# Patient Record
Sex: MALE | Race: WHITE | NOT HISPANIC OR LATINO | Employment: FULL TIME | RURAL
[De-identification: names, ages, dates, MRNs, and addresses within clinical notes are randomized per-mention and may not be internally consistent; named-entity substitution may affect disease eponyms.]

---

## 2018-03-15 ENCOUNTER — TRANSCRIBE ORDERS (OUTPATIENT)
Dept: PHYSICAL THERAPY | Facility: HOSPITAL | Age: 53
End: 2018-03-15

## 2018-03-15 DIAGNOSIS — M25.531 RIGHT WRIST PAIN: Primary | ICD-10-CM

## 2018-03-21 ENCOUNTER — HOSPITAL ENCOUNTER (OUTPATIENT)
Dept: PHYSICAL THERAPY | Facility: HOSPITAL | Age: 53
Setting detail: THERAPIES SERIES
Discharge: HOME | End: 2018-03-21

## 2018-03-21 DIAGNOSIS — M25.531 RIGHT WRIST PAIN: Primary | ICD-10-CM

## 2018-03-21 PROCEDURE — 97161 PT EVAL LOW COMPLEX 20 MIN: CPT

## 2018-03-22 NOTE — THERAPY DISCHARGE NOTE
"     Outpatient Physical Therapy Ortho Initial Evaluation/Discharge  [facility]     Patient Name: Valerio Ramírez  : 1965  MRN: [id number]  Today's Date: 3/21/2018      Visit Date: 2018    There is no problem list on file for this patient.       History reviewed. No pertinent past medical history.     Past Surgical History:   Procedure Laterality Date   • HERNIA REPAIR       Allergies   Allergen Reactions   • Tylenol [Acetaminophen] Unknown (See Comments)     \"intolerance\"      Medications  Naproxen  Tramadol    Visit Dx:     ICD-10-CM ICD-9-CM   1. Right wrist pain M25.531 719.43             Patient History     Row Name 18 1600             History    Chief Complaint Pain  -NH      Type of Pain Wrist pain  -NH      Date Current Problem(s) Began 02/10/18  -NH      Brief Description of Current Complaint Patient fell and landed on R wrist. Patient reports his X-rays were inconclusive and MD wasn't absolutely sure whether or not the bones were fractured. Stated that he had multiple X-rays done.  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 Gripping, using arm  -NH        User Key  (r) = Recorded By, (t) = Taken By, (c) = Cosigned By    Initials Name Provider Type    NH Nel Vidal, PT Physical Therapist                PT Ortho     Row Name 18 1600       Subjective Comments    Subjective Comments Patient reports the MD released him and he doesn't have any restrictions.   -NH       Posture/Observations    Posture/Observations Comments Normal, slightly rounded shld. Patient wearing wrist brace at entry. Patient able to make full composite fist and good opposition all fingers.  -NH       General ROM    RT Upper Ext Rt Wrist " Extension;Rt Wrist Flexion;Rt Ulnar Deviation;Rt Radial Deviation;Rt Elbow Supination;Rt Elbow Pronation  -NH       Right Upper Ext    Rt Elbow Supination AROM 90  -NH    Rt Elbow Pronation AROM 78  -NH    Rt Wrist Flexion AROM 50  -NH    Rt Wrist Extension AROM 60  -NH    Rt  Ulnar Deviation AROM 28  -NH    Rt  Radial Deviation AROM 24  -NH        Strength Right    # Reps 1  -NH    Right Rung 2  -NH    Right  Test 1 40  -NH     Strength Average Right 40  -NH        Strength Left    # Reps 1  -NH    Left Rung 2  -NH    Left  Test 1 60  -NH     Strength Average Left 60  -NH       Hand  Strength     Strength Affected Side Right;Left  -NH      User Key  (r) = Recorded By, (t) = Taken By, (c) = Cosigned By    Initials Name Provider Type    NH Nel Vidal, PT Physical Therapist                                  PT OP Goals     Row Name 03/21/18 1900          PT Short Term Goals    STG Date to Achieve 03/21/18  -NH     STG 1 Patient will verbalize understanding and demonstrate proper return of HEP.  -NH     STG 1 Progress Met  -NH        Time Calculation    PT Goal Re-Cert Due Date 03/21/18  -NH       User Key  (r) = Recorded By, (t) = Taken By, (c) = Cosigned By    Initials Name Provider Type    NH Nel Vidal, PT Physical Therapist                PT Assessment/Plan     Row Name 03/21/18 1900          PT Assessment    Functional Limitations Limitation in home management;Limitations in functional capacity and performance;Performance in work activities  -NH     Impairments Range of motion;Pain;Muscle strength;Joint mobility  -NH     Assessment Comments Patient is a 53 year old male presenting to therapy with R wrist pain resultant of a mechanical fall. Patient presents with impairments in ROM and functional  strength. Patient has good dexterity of fingers and able to make full composite fist. Patient would benefit from skilled physical therapy to address deficits and return to  [facility]; however, Patient is refusing further therapy at this time due to financial and insurance restrictions. Patient was given comprehensive HEP and tips on advancements. Patient able to demonstrate proper return.   -NH     Please refer to paper survey for additional self-reported information Yes  -NH     Rehab Potential Good  -NH     Patient/caregiver participated in establishment of treatment plan and goals Yes  -NH     Patient would benefit from skilled therapy intervention Yes  -NH        PT Plan    PT Frequency One time visit  -NH     Planned CPT's? PT EVAL LOW COMPLEXITY: 50896  -NH     Physical Therapy Interventions (Optional Details) home exercise program  -NH     PT Plan Comments 1x HEP  -NH       User Key  (r) = Recorded By, (t) = Taken By, (c) = Cosigned By    Initials Name Provider Type    NH Nel Vidal, PT Physical Therapist                Exercises     Row Name 03/21/18 1600             Subjective Comments    Subjective Comments Patient reports the MD released him and he doesn't have any restrictions.   -NH         Aquatics    Aquatics performed? No  -NH         Exercise 1    Exercise Name 1 6 pack hand  -NH      Reps 1 20 each  -NH         Exercise 2    Exercise Name 2 AROM Wrist  -NH      Reps 2 20 each  -NH         Exercise 3    Exercise Name 3 Supination/Pronation  -NH      Reps 3 20 each  -NH         Exercise 4    Exercise Name 4 Putty ,pinches,star finger extension  -NH      Time 4 2 min each  -NH        User Key  (r) = Recorded By, (t) = Taken By, (c) = Cosigned By    Initials Name Provider Type    NH Nel Vidal, PT Physical Therapist                       Outcome Measure Options: Quick DASH  Quick DASH  Open a tight or new jar.: Severe Difficulty  Do heavy household chores (e.g., wash walls, wash floors): Moderate Difficulty  Carry a shopping bag or briefcase: Moderate Difficulty  Wash your back: Mild Difficulty  Use a knife to cut food: Mild Difficulty  Recreational activities  in which you take some force or impact through your arm, should or hand (e.g. golf, hammering, tennis, etc.): Severe Difficulty  During the past week, to what extent has your arm, shoulder, or hand problem interfered with your normal social activites with family, friends, neighbors or groups?: Moderately  During the past week, were you limited in your work or other regular daily activities as a result of your arm, shoulder or hand problem?: Moderately Limited  Arm, Shoulder, or hand pain: Severe  Tingling (pins and needles) in your arm, shoulder, or hand: Moderate  During the past week, how much difficulty have you had sleeping because of the pain in your arm, shoulder or hand?: Mild Difficulty  Number of Questions Answered: 11  Quick DASH Score: 50         Time Calculation:   Start Time: 1615  Stop Time: 1645  Time Calculation (min): 30 min  Total Timed Code Minutes- PT: 0 minute(s)    Therapy Charges for Today     Code Description Service Date Service Provider Modifiers Qty    84664019787 HC PT EVAL LOW COMPLEXITY 2 3/21/2018 Nel Vidal, PT GP 1          PT G-Codes  Outcome Measure Options: Quick DASH              Nel Vidal, PT  3/21/2018

## 2019-05-23 NOTE — PROGRESS NOTES
NIMESH Mendez  [facility]   Respiratory Disease Clinic  [address] [place] [address]  Phone: [phone]  Fax: [phone]     Valerio Ramírez is a 54 y.o. male.   CC:   Chief Complaint   Patient presents with   • Shortness of Breath        HPI: This is a new patient referral who reports that he has had asthma his entire life.  This past year he has had frequent bronchitis and is currently finishing up some antibiotics and is on Mucinex as well.  He is a non-smoker, he reports that he smoked for a short period of time when he was in high school but not as an adult.  He works in the [employer] system and does occasionally have exposure to lung irritants such as pepper spray.  He had a chest x-ray done at [facility] in February of this year that showed some air trapping but no acute infiltrates.  He denies any specific daily symptoms related to his asthma and says that if it had not been for the frequent bronchitis this year he probably would not have sought pulmonary evaluation.  He has had complete pulmonary function testing today that shows a moderate obstructive defect that would be consistent with his history of asthma.  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  He is on daily Singulair and occasionally uses his rescue inhaler and/or albuterol breathing treatments.  He stays up-to-date on flu and pneumonia vaccines and says that he recently had his annual TB skin test done as well.    The following portions of the patient's history were reviewed and updated as appropriate: allergies, current medications, past family history, past medical history, past social history, past surgical history and problem list.    History reviewed. No  "pertinent past medical history.    History reviewed. No pertinent family history.    Social History     Socioeconomic History   • Marital status:      Spouse name: Not on file   • Number of children: Not on file   • Years of education: Not on file   • Highest education level: Not on file   Tobacco Use   • Smoking status: Former Smoker   • Smokeless tobacco: Never Used   Substance and Sexual Activity   • Drug use: No   • Sexual activity: Defer       Review of Systems   Constitutional: Negative for appetite change, chills, fatigue and fever.   HENT: Negative for trouble swallowing and voice change.    Eyes: Negative for visual disturbance.   Respiratory: Positive for cough and wheezing.    Cardiovascular: Negative for chest pain and leg swelling.   Gastrointestinal: Negative for abdominal distention, abdominal pain, nausea and vomiting.   Genitourinary: Negative.    Musculoskeletal: Negative for gait problem and myalgias.   Skin: Negative.    Neurological: Negative for weakness.   Hematological: Negative.    Psychiatric/Behavioral: The patient is not nervous/anxious.         Reports snoring but denies poor sleep quality       /84   Pulse 86   Ht 172.7 cm (68\")   Wt 100 kg (221 lb)   SpO2 99% Comment: RA  BMI 33.60 kg/m²     Physical Exam   Constitutional: He is oriented to person, place, and time. He appears well-developed and well-nourished. No distress.   HENT:   Head: Normocephalic and atraumatic.   Mallampati Class III   Eyes: Pupils are equal, round, and reactive to light. No scleral icterus.   Neck: Normal range of motion. Neck supple.   Cardiovascular: Normal rate, regular rhythm, S1 normal and S2 normal.   Pulmonary/Chest: Effort normal and breath sounds normal. No tachypnea. He has no wheezes. He has no rhonchi. He has no rales.   Abdominal: Soft. Bowel sounds are normal. He exhibits no distension.   Musculoskeletal: Normal range of motion. He exhibits tenderness (wrap to right knee). He " exhibits no edema.   Lymphadenopathy:     He has no cervical adenopathy.   Neurological: He is alert and oriented to person, place, and time.   Skin: Skin is warm and dry. No rash noted. No cyanosis. Nails show no clubbing.   Psychiatric: He has a normal mood and affect. His behavior is normal.   Vitals reviewed.      My interpretation of  Pulmonary Functions Testing: Spirometry shows a moderate obstructive defect with FEV1 of 73% predicted, mid flows are decreased at 49% predicted.  Actual FEV1/FVC is 67.08.  Lung volumes show a normal total lung capacity, and increase in functional residual capacity and residual volume that would be indicative of some air trapping.  Diffusion capacity is normal.    FEV1   Date Value Ref Range Status   05/24/2019 73% liters Final     FVC   Date Value Ref Range Status   05/24/2019 88% liters Final     FEV1/FVC   Date Value Ref Range Status   05/24/2019 67.08 % Final     TLC   Date Value Ref Range Status   05/24/2019 108% liters Final     DLCO   Date Value Ref Range Status   05/24/2019 121% ml/mmHg sec Final       Valerio was seen today for shortness of breath.    Diagnoses and all orders for this visit:    Moderate asthma, unspecified whether complicated, unspecified whether persistent  -     Pulmonary Function Test  -     Alpha - 1 - Antitrypsin; Future    Simple chronic bronchitis (CMS/HCC)      Patient's Body mass index is 33.6 kg/m². BMI is above normal parameters. Recommendations include: referral to primary care.      Follow-up/Plan: Alpha-1 antitrypsin deficiency testing has been done today and we will call the results of that to the patient.  He does not have any desire to be on any long-acting bronchodilator therapy so he will continue with albuterol in the form of a rescue inhaler and/or breathing treatments.  He does take Singulair daily and uses Mucinex as needed.  Return to clinic in 1 year with annual spirometry.    Donato Bliss, NIMESH  5/24/2019  12:01  PM

## 2019-05-24 ENCOUNTER — OFFICE VISIT (OUTPATIENT)
Dept: PULMONOLOGY | Facility: CLINIC | Age: 54
End: 2019-05-24

## 2019-05-24 VITALS
DIASTOLIC BLOOD PRESSURE: 84 MMHG | HEIGHT: 68 IN | OXYGEN SATURATION: 99 % | BODY MASS INDEX: 33.49 KG/M2 | HEART RATE: 86 BPM | WEIGHT: 221 LBS | SYSTOLIC BLOOD PRESSURE: 152 MMHG

## 2019-05-24 DIAGNOSIS — J41.0 SIMPLE CHRONIC BRONCHITIS (HCC): ICD-10-CM

## 2019-05-24 DIAGNOSIS — R06.02 SHORTNESS OF BREATH: Primary | ICD-10-CM

## 2019-05-24 DIAGNOSIS — J45.909 MODERATE ASTHMA, UNSPECIFIED WHETHER COMPLICATED, UNSPECIFIED WHETHER PERSISTENT: Primary | ICD-10-CM

## 2019-05-24 LAB
DIFF CAP.CO: NORMAL ML/MMHG SEC
FEV1/FVC: 67.08 %
FEV1: NORMAL LITERS
FVC VOL RESPIRATORY: NORMAL LITERS
TLC: NORMAL LITERS

## 2019-05-24 PROCEDURE — 94729 DIFFUSING CAPACITY: CPT | Performed by: NURSE PRACTITIONER

## 2019-05-24 PROCEDURE — 36415 COLL VENOUS BLD VENIPUNCTURE: CPT | Performed by: NURSE PRACTITIONER

## 2019-05-24 PROCEDURE — 99204 OFFICE O/P NEW MOD 45 MIN: CPT | Performed by: NURSE PRACTITIONER

## 2019-05-24 PROCEDURE — 94010 BREATHING CAPACITY TEST: CPT | Performed by: NURSE PRACTITIONER

## 2019-05-24 PROCEDURE — 94727 GAS DIL/WSHOT DETER LNG VOL: CPT | Performed by: NURSE PRACTITIONER

## 2019-05-24 RX ORDER — DILTIAZEM HYDROCHLORIDE 120 MG/1
120 TABLET, FILM COATED ORAL
COMMUNITY
Start: 2014-07-21

## 2019-05-24 RX ORDER — DULAGLUTIDE 0.75 MG/.5ML
INJECTION, SOLUTION SUBCUTANEOUS
COMMUNITY
Start: 2019-02-15

## 2019-05-24 RX ORDER — PRAVASTATIN SODIUM 40 MG
40 TABLET ORAL
COMMUNITY
Start: 2014-07-21

## 2019-05-24 RX ORDER — MONTELUKAST SODIUM 10 MG/1
10 TABLET ORAL NIGHTLY
COMMUNITY

## 2019-05-24 RX ORDER — COVID-19 ANTIGEN TEST
KIT MISCELLANEOUS
COMMUNITY

## 2019-05-24 RX ORDER — ALBUTEROL SULFATE 90 UG/1
AEROSOL, METERED RESPIRATORY (INHALATION)
COMMUNITY
Start: 2014-01-13

## 2019-05-24 RX ORDER — LISINOPRIL 30 MG/1
TABLET ORAL
COMMUNITY

## 2019-05-24 RX ORDER — KETOROLAC TROMETHAMINE 10 MG/1
TABLET, FILM COATED ORAL
COMMUNITY
Start: 2019-05-22

## 2019-05-24 RX ORDER — ALLOPURINOL 300 MG/1
TABLET ORAL
COMMUNITY
Start: 2014-07-21

## 2019-05-24 RX ORDER — NATEGLINIDE 120 MG/1
TABLET ORAL
COMMUNITY

## 2019-05-24 ASSESSMENT — PULMONARY FUNCTION TESTS: FEV1/FVC: 67.08

## 2019-05-24 NOTE — PATIENT INSTRUCTIONS
Asthma, Adult  Asthma is a long-term (chronic) condition in which the airways get tight and narrow. The airways are the breathing passages that lead from the nose and mouth down into the lungs. A person with asthma will have times when symptoms get worse. These are called asthma attacks. They can cause coughing, whistling sounds when you breathe (wheezing), shortness of breath, and chest pain. They can make it hard to breathe. There is no cure for asthma, but medicines and lifestyle changes can help control it.  There are many things that can bring on an asthma attack or make asthma symptoms worse (triggers). Common triggers include:  · Mold.  · Dust.  · Cigarette smoke.  · Cockroaches.  · Things that can cause allergy symptoms (allergens). These include animal skin flakes (dander) and pollen from trees or grass.  · Things that pollute the air. These may include household , wood smoke, smog, or chemical odors.  · Cold air, weather changes, and wind.  · Crying or laughing hard.  · Stress.  · Certain medicines or drugs.  · Certain foods such as dried fruit, potato chips, and grape juice.  · Infections, such as a cold or the flu.  · Certain medical conditions or diseases.  · Exercise or tiring activities.    Asthma may be treated with medicines and by staying away from the things that cause asthma attacks. Types of medicines may include:  · Controller medicines. These help prevent asthma symptoms. They are usually taken every day.  · Fast-acting reliever or rescue medicines. These quickly relieve asthma symptoms. They are used as needed and provide short-term relief.  · Allergy medicines if your attacks are brought on by allergens.  · Medicines to help control the body's defense (immune) system.    Follow these instructions at home:  Avoiding triggers in your home  · Change your heating and air conditioning filter often.  · Limit your use of fireplaces and wood stoves.  · Get rid of pests (such as roaches and  mice) and their droppings.  · Throw away plants if you see mold on them.  · Clean your floors. Dust regularly. Use cleaning products that do not smell.  · Have someone vacuum when you are not home. Use a vacuum  with a HEPA filter if possible.  · Replace carpet with wood, tile, or vinyl yosi. Carpet can trap animal skin flakes and dust.  · Use allergy-proof pillows, mattress covers, and box spring covers.  · Wash bed sheets and blankets every week in hot water. Dry them in a dryer.  · Keep your bedroom free of any triggers.   · Avoid pets and keep windows closed when things that cause allergy symptoms are in the air.  · Use blankets that are made of polyester or cotton.  · Clean bathrooms and osiris with bleach. If possible, have someone repaint the walls in these rooms with mold-resistant paint. Keep out of the rooms that are being cleaned and painted.  · Wash your hands often with soap and water. If soap and water are not available, use hand .  · Do not allow anyone to smoke in your home.  General instructions  · Take over-the-counter and prescription medicines only as told by your doctor.  ? Talk with your doctor if you have questions about how or when to take your medicines.  ? Make note if you need to use your medicines more often than usual.  · Do not use any products that contain nicotine or tobacco, such as cigarettes and e-cigarettes. If you need help quitting, ask your doctor.  · Stay away from secondhand smoke.  · Avoid doing things outdoors when allergen counts are high and when air quality is low.  · Wear a ski mask when doing outdoor activities in the winter. The mask should cover your nose and mouth. Exercise indoors on cold days if you can.  · Warm up before you exercise. Take time to cool down after exercise.  · Use a peak flow meter as told by your doctor. A peak flow meter is a tool that measures how well the lungs are working.  · Keep track of the peak flow meter's readings.  Write them down.  · Follow your asthma action plan. This is a written plan for taking care of your asthma and treating your attacks.  · Make sure you get all the shots (vaccines) that your doctor recommends. Ask your doctor about a flu shot and a pneumonia shot.  · Keep all follow-up visits as told by your doctor. This is important.  Contact a doctor if:  · You have wheezing, shortness of breath, or a cough even while taking medicine to prevent attacks.  · The mucus you cough up (sputum) is thicker than usual.  · The mucus you cough up changes from clear or white to yellow, green, gray, or bloody.  · You have problems from the medicine you are taking, such as:  ? A rash.  ? Itching.  ? Swelling.  ? Trouble breathing.  · You need reliever medicines more than 2-3 times a week.  · Your peak flow reading is still at 50-79% of your personal best after following the action plan for 1 hour.  · You have a fever.  Get help right away if:  · You seem to be worse and are not responding to medicine during an asthma attack.  · You are short of breath even at rest.  · You get short of breath when doing very little activity.  · You have trouble eating, drinking, or talking.  · You have chest pain or tightness.  · You have a fast heartbeat.  · Your lips or fingernails start to turn blue.  · You are light-headed or dizzy, or you faint.  · Your peak flow is less than 50% of your personal best.  · You feel too tired to breathe normally.  Summary  · Asthma is a long-term (chronic) condition in which the airways get tight and narrow. An asthma attack can make it hard to breathe.  · Asthma cannot be cured, but medicines and lifestyle changes can help control it.  · Make sure you understand how to avoid triggers and how and when to use your medicines.  This information is not intended to replace advice given to you by your health care provider. Make sure you discuss any questions you have with your health care provider.  Document  Released: 06/05/2009 Document Revised: 01/22/2018 Document Reviewed: 01/22/2018  [employer] Interactive Patient Education © 2019 [employer]

## 2019-06-11 DIAGNOSIS — J45.909 MODERATE ASTHMA, UNSPECIFIED WHETHER COMPLICATED, UNSPECIFIED WHETHER PERSISTENT: ICD-10-CM
